# Patient Record
Sex: MALE | Race: OTHER | Employment: FULL TIME | URBAN - METROPOLITAN AREA
[De-identification: names, ages, dates, MRNs, and addresses within clinical notes are randomized per-mention and may not be internally consistent; named-entity substitution may affect disease eponyms.]

---

## 2020-07-24 ENCOUNTER — HOSPITAL ENCOUNTER (OUTPATIENT)
Age: 40
Discharge: HOME | End: 2020-07-24
Attending: EMERGENCY MEDICINE
Payer: COMMERCIAL

## 2020-07-24 VITALS
DIASTOLIC BLOOD PRESSURE: 78 MMHG | RESPIRATION RATE: 18 BRPM | HEIGHT: 66 IN | SYSTOLIC BLOOD PRESSURE: 160 MMHG | OXYGEN SATURATION: 97 % | HEART RATE: 96 BPM | BODY MASS INDEX: 34.07 KG/M2 | WEIGHT: 212 LBS | TEMPERATURE: 98 F

## 2020-07-24 DIAGNOSIS — U07.1 COVID-19: ICD-10-CM

## 2020-07-24 DIAGNOSIS — R19.7 DIARRHEA: Primary | ICD-10-CM

## 2020-07-24 PROCEDURE — 99202 OFFICE O/P NEW SF 15 MIN: CPT | Performed by: EMERGENCY MEDICINE

## 2020-07-24 RX ORDER — LISINOPRIL AND HYDROCHLOROTHIAZIDE 20; 12.5 MG/1; MG/1
1 TABLET ORAL DAILY
COMMUNITY
Start: 2020-05-22

## 2020-07-25 NOTE — ED PROVIDER NOTES
Patient Seen in: [facility] In [address] [place] [address]    History   Patient presents with:  Diarrhea    Stated Complaint: fever    HPI    Patient complains of fatigue, elevated temp, and diarrhea. No cough  Exposure to covid per pt.  Possible skin turgor, no  rashes  PSYCH: calm, cooperative,    Differential includes:    ED Course     Labs Reviewed   SARS-COV-2 BY PCR ()       MDM       Cardiac Monitor: Pulse Readings from Last 1 Encounters:  07/24/20 : 96  , ,      Radiology findings: No

## 2020-07-26 LAB — SARS-COV-2 RNA RESP QL NAA+PROBE: NOT DETECTED
